# Patient Record
Sex: FEMALE | Race: ASIAN | NOT HISPANIC OR LATINO
[De-identification: names, ages, dates, MRNs, and addresses within clinical notes are randomized per-mention and may not be internally consistent; named-entity substitution may affect disease eponyms.]

---

## 2017-06-20 PROBLEM — Z94.0 KIDNEY TRANSPLANT RECIPIENT: Status: ACTIVE | Noted: 2017-06-20

## 2017-06-20 PROBLEM — I10 ESSENTIAL HYPERTENSION: Status: ACTIVE | Noted: 2017-06-20

## 2017-07-17 ENCOUNTER — HOSPITAL (OUTPATIENT)
Dept: OTHER | Age: 57
End: 2017-07-17
Attending: OPHTHALMOLOGY

## 2017-10-12 PROBLEM — M81.0 OSTEOPOROSIS WITHOUT CURRENT PATHOLOGICAL FRACTURE, UNSPECIFIED OSTEOPOROSIS TYPE: Status: ACTIVE | Noted: 2017-10-12

## 2017-10-12 PROBLEM — E03.9 ACQUIRED HYPOTHYROIDISM: Status: ACTIVE | Noted: 2017-10-12

## 2018-07-16 ENCOUNTER — HOSPITAL (OUTPATIENT)
Dept: OTHER | Age: 58
End: 2018-07-16
Attending: OPHTHALMOLOGY

## 2018-08-11 ENCOUNTER — APPOINTMENT (OUTPATIENT)
Dept: CT IMAGING | Facility: HOSPITAL | Age: 58
End: 2018-08-11
Attending: EMERGENCY MEDICINE
Payer: COMMERCIAL

## 2018-08-11 ENCOUNTER — HOSPITAL ENCOUNTER (EMERGENCY)
Facility: HOSPITAL | Age: 58
Discharge: HOME | End: 2018-08-11
Attending: EMERGENCY MEDICINE
Payer: COMMERCIAL

## 2018-08-11 VITALS
TEMPERATURE: 98 F | WEIGHT: 155 LBS | HEART RATE: 92 BPM | DIASTOLIC BLOOD PRESSURE: 71 MMHG | HEIGHT: 63 IN | SYSTOLIC BLOOD PRESSURE: 138 MMHG | OXYGEN SATURATION: 97 % | RESPIRATION RATE: 16 BRPM | BODY MASS INDEX: 27.46 KG/M2

## 2018-08-11 DIAGNOSIS — K52.9 GASTROENTERITIS: Primary | ICD-10-CM

## 2018-08-11 DIAGNOSIS — D72.829 LEUKOCYTOSIS, UNSPECIFIED TYPE: ICD-10-CM

## 2018-08-11 LAB
ALBUMIN SERPL-MCNC: 3.7 G/DL (ref 3.5–4.8)
ALBUMIN/GLOB SERPL: 0.8 {RATIO} (ref 1–2)
ALP LIVER SERPL-CCNC: 50 U/L (ref 46–118)
ALT SERPL-CCNC: 18 U/L (ref 14–54)
ANION GAP SERPL CALC-SCNC: 9 MMOL/L (ref 0–18)
AST SERPL-CCNC: 16 U/L (ref 15–41)
BASOPHILS # BLD AUTO: 0.05 X10(3) UL (ref 0–0.1)
BASOPHILS NFR BLD AUTO: 0.2 %
BILIRUB SERPL-MCNC: 0.5 MG/DL (ref 0.1–2)
BUN BLD-MCNC: 30 MG/DL (ref 8–20)
BUN/CREAT SERPL: 12.4 (ref 10–20)
CALCIUM BLD-MCNC: 9.4 MG/DL (ref 8.3–10.3)
CHLORIDE SERPL-SCNC: 111 MMOL/L (ref 101–111)
CO2 SERPL-SCNC: 21 MMOL/L (ref 22–32)
CREAT BLD-MCNC: 2.42 MG/DL (ref 0.55–1.02)
EOSINOPHIL # BLD AUTO: 0.1 X10(3) UL (ref 0–0.3)
EOSINOPHIL NFR BLD AUTO: 0.4 %
ERYTHROCYTE [DISTWIDTH] IN BLOOD BY AUTOMATED COUNT: 13.2 % (ref 11.5–16)
GLOBULIN PLAS-MCNC: 4.7 G/DL (ref 2.5–3.7)
GLUCOSE BLD-MCNC: 147 MG/DL (ref 70–99)
HCT VFR BLD AUTO: 45.3 % (ref 34–50)
HGB BLD-MCNC: 15 G/DL (ref 12–16)
IMMATURE GRANULOCYTE COUNT: 0.1 X10(3) UL (ref 0–1)
IMMATURE GRANULOCYTE RATIO %: 0.4 %
LYMPHOCYTES # BLD AUTO: 2.27 X10(3) UL (ref 0.9–4)
LYMPHOCYTES NFR BLD AUTO: 9.2 %
M PROTEIN MFR SERPL ELPH: 8.4 G/DL (ref 6.1–8.3)
MCH RBC QN AUTO: 28.3 PG (ref 27–33.2)
MCHC RBC AUTO-ENTMCNC: 33.1 G/DL (ref 31–37)
MCV RBC AUTO: 85.5 FL (ref 81–100)
MONOCYTES # BLD AUTO: 1.72 X10(3) UL (ref 0.1–1)
MONOCYTES NFR BLD AUTO: 6.9 %
NEUTROPHIL ABS PRELIM: 20.55 X10 (3) UL (ref 1.3–6.7)
NEUTROPHILS # BLD AUTO: 20.55 X10(3) UL (ref 1.3–6.7)
NEUTROPHILS NFR BLD AUTO: 82.9 %
OSMOLALITY SERPL CALC.SUM OF ELEC: 301 MOSM/KG (ref 275–295)
PLATELET # BLD AUTO: 270 10(3)UL (ref 150–450)
POTASSIUM SERPL-SCNC: 4.1 MMOL/L (ref 3.6–5.1)
RBC # BLD AUTO: 5.3 X10(6)UL (ref 3.8–5.1)
RED CELL DISTRIBUTION WIDTH-SD: 40.8 FL (ref 35.1–46.3)
SODIUM SERPL-SCNC: 141 MMOL/L (ref 136–144)
WBC # BLD AUTO: 24.8 X10(3) UL (ref 4–13)

## 2018-08-11 PROCEDURE — 85025 COMPLETE CBC W/AUTO DIFF WBC: CPT | Performed by: EMERGENCY MEDICINE

## 2018-08-11 PROCEDURE — 96374 THER/PROPH/DIAG INJ IV PUSH: CPT

## 2018-08-11 PROCEDURE — 99285 EMERGENCY DEPT VISIT HI MDM: CPT

## 2018-08-11 PROCEDURE — 80053 COMPREHEN METABOLIC PANEL: CPT | Performed by: EMERGENCY MEDICINE

## 2018-08-11 PROCEDURE — 99284 EMERGENCY DEPT VISIT MOD MDM: CPT

## 2018-08-11 PROCEDURE — 96375 TX/PRO/DX INJ NEW DRUG ADDON: CPT

## 2018-08-11 PROCEDURE — 74176 CT ABD & PELVIS W/O CONTRAST: CPT | Performed by: EMERGENCY MEDICINE

## 2018-08-11 PROCEDURE — 96361 HYDRATE IV INFUSION ADD-ON: CPT

## 2018-08-11 RX ORDER — DICYCLOMINE HCL 20 MG
20 TABLET ORAL 3 TIMES DAILY
Qty: 20 TABLET | Refills: 0 | Status: SHIPPED | OUTPATIENT
Start: 2018-08-11 | End: 2018-09-25 | Stop reason: ALTCHOICE

## 2018-08-11 RX ORDER — ONDANSETRON 2 MG/ML
4 INJECTION INTRAMUSCULAR; INTRAVENOUS ONCE
Status: COMPLETED | OUTPATIENT
Start: 2018-08-11 | End: 2018-08-11

## 2018-08-11 RX ORDER — ONDANSETRON 4 MG/1
4 TABLET, ORALLY DISINTEGRATING ORAL EVERY 4 HOURS PRN
Qty: 20 TABLET | Refills: 0 | Status: SHIPPED | OUTPATIENT
Start: 2018-08-11 | End: 2018-08-16

## 2018-08-11 RX ORDER — MORPHINE SULFATE 4 MG/ML
4 INJECTION, SOLUTION INTRAMUSCULAR; INTRAVENOUS EVERY 30 MIN PRN
Status: DISCONTINUED | OUTPATIENT
Start: 2018-08-11 | End: 2018-08-11

## 2018-08-11 NOTE — ED PROVIDER NOTES
Patient Seen in: [facility] Emergency Department    History   Patient presents with:  Nausea/Vomiting/Diarrhea (gastrointestinal)  Abdomen/Flank Pain (GI/)    Stated Complaint:     HPI    Patient is a 17-year-old female comes emergency room for ev reactive to light and accommodation, extraocular motion is intact, sclerae white, conjunctiva is pink. Oropharynx is unremarkable, no exudate. NECK: Supple, trachea midline, no lymphadenopathy.    LUNG: Lungs clear to auscultation bilaterally, no wheezing RAINBOW DRAW GOLD   C. DIFFICILE(TOXIGENIC)PCR   OCCULT BLOOD, STOOL   STOOL CULTURE W/SHIGATOXIN   White blood cell 24.8. Glucose 147. Bicarb 21. BUN 30.   Creatinine 2.42.    CT abdomen pelvis without contrast    IMPRESSION:    No findings to explain verbal and written discharge and follow-up instructions were provided to help prevent relapse or worsening.   Patient was instructed to follow-up with her primary care provider for further evaluation and treatment, but to return immediately to the ER for wo

## 2018-08-11 NOTE — ED NOTES
Pt ok to have po fluids per Dr. Ronnie Shabazz. Pt provided with cup of water and encouraged to drink slowly.

## 2018-08-15 ENCOUNTER — LAB ENCOUNTER (OUTPATIENT)
Dept: LAB | Age: 58
End: 2018-08-15
Attending: EMERGENCY MEDICINE
Payer: COMMERCIAL

## 2018-08-15 DIAGNOSIS — R69 DIAGNOSIS UNKNOWN: Primary | ICD-10-CM

## 2018-08-15 PROCEDURE — 87045 FECES CULTURE AEROBIC BACT: CPT

## 2018-08-15 PROCEDURE — 89055 LEUKOCYTE ASSESSMENT FECAL: CPT

## 2018-08-15 PROCEDURE — 87427 SHIGA-LIKE TOXIN AG IA: CPT

## 2018-08-15 PROCEDURE — 87077 CULTURE AEROBIC IDENTIFY: CPT

## 2018-08-15 PROCEDURE — 87046 STOOL CULTR AEROBIC BACT EA: CPT

## 2018-08-15 PROCEDURE — 87493 C DIFF AMPLIFIED PROBE: CPT

## 2018-10-10 PROCEDURE — 84100 ASSAY OF PHOSPHORUS: CPT | Performed by: INTERNAL MEDICINE

## 2018-10-10 PROCEDURE — 82043 UR ALBUMIN QUANTITATIVE: CPT | Performed by: INTERNAL MEDICINE

## 2018-10-10 PROCEDURE — 82310 ASSAY OF CALCIUM: CPT | Performed by: INTERNAL MEDICINE

## 2018-10-10 PROCEDURE — 81001 URINALYSIS AUTO W/SCOPE: CPT | Performed by: INTERNAL MEDICINE

## 2018-10-10 PROCEDURE — 82570 ASSAY OF URINE CREATININE: CPT | Performed by: INTERNAL MEDICINE

## 2018-10-10 PROCEDURE — 84156 ASSAY OF PROTEIN URINE: CPT | Performed by: INTERNAL MEDICINE

## 2018-10-10 PROCEDURE — 82565 ASSAY OF CREATININE: CPT | Performed by: INTERNAL MEDICINE

## 2018-10-10 PROCEDURE — 83970 ASSAY OF PARATHORMONE: CPT | Performed by: INTERNAL MEDICINE

## 2018-10-10 PROCEDURE — 80197 ASSAY OF TACROLIMUS: CPT | Performed by: INTERNAL MEDICINE

## 2018-10-22 PROBLEM — M54.50 LUMBAR BACK PAIN: Status: ACTIVE | Noted: 2018-10-22

## 2018-11-06 PROBLEM — M25.562 ACUTE PAIN OF LEFT KNEE: Status: ACTIVE | Noted: 2018-11-06

## 2018-12-01 PROCEDURE — 80197 ASSAY OF TACROLIMUS: CPT | Performed by: INTERNAL MEDICINE

## 2018-12-29 ENCOUNTER — APPOINTMENT (OUTPATIENT)
Dept: GENERAL RADIOLOGY | Facility: HOSPITAL | Age: 58
End: 2018-12-29
Attending: EMERGENCY MEDICINE
Payer: COMMERCIAL

## 2018-12-29 ENCOUNTER — APPOINTMENT (OUTPATIENT)
Dept: ULTRASOUND IMAGING | Facility: HOSPITAL | Age: 58
End: 2018-12-29
Attending: EMERGENCY MEDICINE
Payer: COMMERCIAL

## 2018-12-29 ENCOUNTER — HOSPITAL ENCOUNTER (EMERGENCY)
Facility: HOSPITAL | Age: 58
Discharge: HOME | End: 2018-12-29
Attending: EMERGENCY MEDICINE
Payer: COMMERCIAL

## 2018-12-29 VITALS
OXYGEN SATURATION: 100 % | DIASTOLIC BLOOD PRESSURE: 74 MMHG | HEART RATE: 68 BPM | SYSTOLIC BLOOD PRESSURE: 132 MMHG | HEIGHT: 64 IN | WEIGHT: 142 LBS | TEMPERATURE: 97 F | RESPIRATION RATE: 18 BRPM | BODY MASS INDEX: 24.24 KG/M2

## 2018-12-29 DIAGNOSIS — I82.401 ACUTE DEEP VEIN THROMBOSIS (DVT) OF RIGHT LOWER EXTREMITY, UNSPECIFIED VEIN (HCC): Primary | ICD-10-CM

## 2018-12-29 LAB
APTT PPP: 24.1 SECONDS (ref 26.1–34.6)
INR BLD: 1.06 (ref 0.9–1.1)
PSA SERPL DL<=0.01 NG/ML-MCNC: 14.2 SECONDS (ref 12.4–14.7)

## 2018-12-29 PROCEDURE — 80197 ASSAY OF TACROLIMUS: CPT | Performed by: INTERNAL MEDICINE

## 2018-12-29 PROCEDURE — 93971 EXTREMITY STUDY: CPT | Performed by: EMERGENCY MEDICINE

## 2018-12-29 PROCEDURE — 99285 EMERGENCY DEPT VISIT HI MDM: CPT

## 2018-12-29 PROCEDURE — 36415 COLL VENOUS BLD VENIPUNCTURE: CPT

## 2018-12-29 PROCEDURE — 85610 PROTHROMBIN TIME: CPT | Performed by: EMERGENCY MEDICINE

## 2018-12-29 PROCEDURE — 73630 X-RAY EXAM OF FOOT: CPT | Performed by: EMERGENCY MEDICINE

## 2018-12-29 PROCEDURE — 85730 THROMBOPLASTIN TIME PARTIAL: CPT | Performed by: EMERGENCY MEDICINE

## 2018-12-29 PROCEDURE — 73590 X-RAY EXAM OF LOWER LEG: CPT | Performed by: EMERGENCY MEDICINE

## 2018-12-29 PROCEDURE — 81001 URINALYSIS AUTO W/SCOPE: CPT | Performed by: INTERNAL MEDICINE

## 2018-12-29 PROCEDURE — 87799 DETECT AGENT NOS DNA QUANT: CPT | Performed by: INTERNAL MEDICINE

## 2018-12-29 RX ORDER — HYDROCODONE BITARTRATE AND ACETAMINOPHEN 5; 325 MG/1; MG/1
1 TABLET ORAL ONCE
Status: COMPLETED | OUTPATIENT
Start: 2018-12-29 | End: 2018-12-29

## 2018-12-29 RX ORDER — HYDROCODONE BITARTRATE AND ACETAMINOPHEN 5; 325 MG/1; MG/1
1-2 TABLET ORAL EVERY 4 HOURS PRN
Qty: 10 TABLET | Refills: 0 | Status: SHIPPED | OUTPATIENT
Start: 2018-12-29 | End: 2019-01-05

## 2018-12-30 NOTE — ED PROVIDER NOTES
Patient Seen in: [facility] Emergency Department    History   Patient presents with:  Lower Extremity Injury (musculoskeletal)    Stated Complaint: right leg and foot pain    HPI    Patient is a 17-year-old female who has a history of right leg foot CVA tenderness, no midline bony tenderness. ABDOMEN: + Bowel sounds, soft, nontender, nondistended. No rebound, no guarding, no hepatosplenomegaly.   EXTREMITIES: Right lower extremity, knee for range of motion nontender, ankle normal dorsiflexion plantar times daily for 7 days. , Normal, Disp-28 tablet, R-0    !! apixaban 5 MG Oral Tab  Take 1 tablet (5 mg total) by mouth 2 (two) times daily for 14 days. , Normal, Disp-28 tablet, R-0    HYDROcodone-acetaminophen 5-325 MG Oral Tab  Take 1-2 tablets by mouth e

## 2018-12-30 NOTE — ED NOTES
Patient and family educated on new prescriptions and discharge instructions. Verbalized understanding.

## 2019-02-13 PROCEDURE — 87799 DETECT AGENT NOS DNA QUANT: CPT | Performed by: INTERNAL MEDICINE

## 2019-02-13 PROCEDURE — 82565 ASSAY OF CREATININE: CPT | Performed by: INTERNAL MEDICINE

## 2019-02-13 PROCEDURE — 81001 URINALYSIS AUTO W/SCOPE: CPT | Performed by: INTERNAL MEDICINE

## 2019-02-13 PROCEDURE — 83970 ASSAY OF PARATHORMONE: CPT | Performed by: INTERNAL MEDICINE

## 2019-02-13 PROCEDURE — 82043 UR ALBUMIN QUANTITATIVE: CPT | Performed by: INTERNAL MEDICINE

## 2019-02-13 PROCEDURE — 84100 ASSAY OF PHOSPHORUS: CPT | Performed by: INTERNAL MEDICINE

## 2019-02-13 PROCEDURE — 84156 ASSAY OF PROTEIN URINE: CPT | Performed by: INTERNAL MEDICINE

## 2019-02-13 PROCEDURE — 80197 ASSAY OF TACROLIMUS: CPT | Performed by: INTERNAL MEDICINE

## 2019-02-13 PROCEDURE — 82310 ASSAY OF CALCIUM: CPT | Performed by: INTERNAL MEDICINE

## 2019-02-13 PROCEDURE — 82570 ASSAY OF URINE CREATININE: CPT | Performed by: INTERNAL MEDICINE

## 2019-04-01 PROCEDURE — 84100 ASSAY OF PHOSPHORUS: CPT | Performed by: INTERNAL MEDICINE

## 2019-04-01 PROCEDURE — 82570 ASSAY OF URINE CREATININE: CPT | Performed by: INTERNAL MEDICINE

## 2019-04-01 PROCEDURE — 82310 ASSAY OF CALCIUM: CPT | Performed by: INTERNAL MEDICINE

## 2019-04-01 PROCEDURE — 80197 ASSAY OF TACROLIMUS: CPT | Performed by: INTERNAL MEDICINE

## 2019-04-01 PROCEDURE — 81001 URINALYSIS AUTO W/SCOPE: CPT | Performed by: INTERNAL MEDICINE

## 2019-04-01 PROCEDURE — 84156 ASSAY OF PROTEIN URINE: CPT | Performed by: INTERNAL MEDICINE

## 2019-04-01 PROCEDURE — 82565 ASSAY OF CREATININE: CPT | Performed by: INTERNAL MEDICINE

## 2019-04-01 PROCEDURE — 83970 ASSAY OF PARATHORMONE: CPT | Performed by: INTERNAL MEDICINE

## 2019-05-15 ENCOUNTER — LAB ENCOUNTER (OUTPATIENT)
Dept: LAB | Age: 59
End: 2019-05-15
Attending: INTERNAL MEDICINE
Payer: COMMERCIAL

## 2019-05-15 DIAGNOSIS — M81.0 SENILE OSTEOPOROSIS: Primary | ICD-10-CM

## 2019-05-15 PROCEDURE — 82306 VITAMIN D 25 HYDROXY: CPT

## 2019-05-15 PROCEDURE — 80048 BASIC METABOLIC PNL TOTAL CA: CPT

## 2019-06-28 PROCEDURE — 82310 ASSAY OF CALCIUM: CPT | Performed by: INTERNAL MEDICINE

## 2019-06-28 PROCEDURE — 81001 URINALYSIS AUTO W/SCOPE: CPT | Performed by: INTERNAL MEDICINE

## 2019-06-28 PROCEDURE — 83970 ASSAY OF PARATHORMONE: CPT | Performed by: INTERNAL MEDICINE

## 2019-06-28 PROCEDURE — 82565 ASSAY OF CREATININE: CPT | Performed by: INTERNAL MEDICINE

## 2019-06-28 PROCEDURE — 80197 ASSAY OF TACROLIMUS: CPT | Performed by: INTERNAL MEDICINE

## 2019-06-28 PROCEDURE — 84100 ASSAY OF PHOSPHORUS: CPT | Performed by: INTERNAL MEDICINE

## 2019-07-22 ENCOUNTER — LAB ENCOUNTER (OUTPATIENT)
Dept: LAB | Age: 59
End: 2019-07-22
Attending: TRANSPLANT SURGERY
Payer: COMMERCIAL

## 2019-07-22 DIAGNOSIS — Z51.81 ENCOUNTER FOR THERAPEUTIC DRUG MONITORING: Primary | ICD-10-CM

## 2019-07-22 LAB
CREAT BLD-MCNC: 0.88 MG/DL (ref 0.55–1.02)
GLUCOSE BLD-MCNC: 92 MG/DL (ref 70–99)
HCT VFR BLD AUTO: 40.1 % (ref 35–48)
POTASSIUM SERPL-SCNC: 4 MMOL/L (ref 3.5–5.1)
WBC # BLD AUTO: 16.9 X10(3) UL (ref 4–11)

## 2019-07-22 PROCEDURE — 84132 ASSAY OF SERUM POTASSIUM: CPT

## 2019-07-22 PROCEDURE — 82947 ASSAY GLUCOSE BLOOD QUANT: CPT

## 2019-07-22 PROCEDURE — 87799 DETECT AGENT NOS DNA QUANT: CPT

## 2019-07-22 PROCEDURE — 85048 AUTOMATED LEUKOCYTE COUNT: CPT

## 2019-07-22 PROCEDURE — 80197 ASSAY OF TACROLIMUS: CPT

## 2019-07-22 PROCEDURE — 82565 ASSAY OF CREATININE: CPT

## 2019-07-22 PROCEDURE — 85014 HEMATOCRIT: CPT

## 2019-07-24 LAB
BK VIRUS DNA, QUANT COPY/ML: <390 CPY/ML
BK VIRUS DNA, QUANT INTERP: NOT DETECTED
BK VIRUS DNA, QUANTITATION: <2.6 LOG
TACROLIMUS: 6.5 NG/ML

## 2019-07-25 ENCOUNTER — LAB ENCOUNTER (OUTPATIENT)
Dept: LAB | Age: 59
End: 2019-07-25
Attending: TRANSPLANT SURGERY
Payer: COMMERCIAL

## 2019-07-25 DIAGNOSIS — Z51.81 ENCOUNTER FOR THERAPEUTIC DRUG MONITORING: ICD-10-CM

## 2019-07-25 DIAGNOSIS — Z79.899 ENCOUNTER FOR LONG-TERM (CURRENT) USE OF OTHER MEDICATIONS: Primary | ICD-10-CM

## 2019-07-25 DIAGNOSIS — Z79.52 LONG TERM CURRENT USE OF SYSTEMIC STEROIDS: ICD-10-CM

## 2019-07-25 LAB
CREAT BLD-MCNC: 1.07 MG/DL (ref 0.55–1.02)
GLUCOSE BLD-MCNC: 96 MG/DL (ref 70–99)
HCT VFR BLD AUTO: 38.4 % (ref 35–48)
POTASSIUM SERPL-SCNC: 4.5 MMOL/L (ref 3.5–5.1)
WBC # BLD AUTO: 14 X10(3) UL (ref 4–11)

## 2019-07-25 PROCEDURE — 85048 AUTOMATED LEUKOCYTE COUNT: CPT

## 2019-07-25 PROCEDURE — 82565 ASSAY OF CREATININE: CPT

## 2019-07-25 PROCEDURE — 84132 ASSAY OF SERUM POTASSIUM: CPT

## 2019-07-25 PROCEDURE — 85014 HEMATOCRIT: CPT

## 2019-07-25 PROCEDURE — 82947 ASSAY GLUCOSE BLOOD QUANT: CPT

## 2019-07-29 ENCOUNTER — LAB ENCOUNTER (OUTPATIENT)
Dept: LAB | Age: 59
End: 2019-07-29
Attending: TRANSPLANT SURGERY
Payer: COMMERCIAL

## 2019-07-29 DIAGNOSIS — Z79.899 ENCOUNTER FOR LONG-TERM (CURRENT) USE OF OTHER MEDICATIONS: Primary | ICD-10-CM

## 2019-07-29 LAB
CREAT BLD-MCNC: 1 MG/DL (ref 0.55–1.02)
GLUCOSE BLD-MCNC: 92 MG/DL (ref 70–99)
HCT VFR BLD AUTO: 37.1 % (ref 35–48)
POTASSIUM SERPL-SCNC: 4.4 MMOL/L (ref 3.5–5.1)
WBC # BLD AUTO: 19.4 X10(3) UL (ref 4–11)

## 2019-07-29 PROCEDURE — 84132 ASSAY OF SERUM POTASSIUM: CPT

## 2019-07-29 PROCEDURE — 85048 AUTOMATED LEUKOCYTE COUNT: CPT

## 2019-07-29 PROCEDURE — 82565 ASSAY OF CREATININE: CPT

## 2019-07-29 PROCEDURE — 85014 HEMATOCRIT: CPT

## 2019-07-29 PROCEDURE — 82947 ASSAY GLUCOSE BLOOD QUANT: CPT

## 2019-08-01 ENCOUNTER — LAB ENCOUNTER (OUTPATIENT)
Dept: LAB | Age: 59
End: 2019-08-01
Attending: TRANSPLANT SURGERY
Payer: COMMERCIAL

## 2019-08-01 DIAGNOSIS — Z79.52 LONG TERM CURRENT USE OF SYSTEMIC STEROIDS: Primary | ICD-10-CM

## 2019-08-01 DIAGNOSIS — Z51.81 ENCOUNTER FOR THERAPEUTIC DRUG MONITORING: ICD-10-CM

## 2019-08-01 DIAGNOSIS — Z79.899 ENCOUNTER FOR LONG-TERM (CURRENT) USE OF OTHER MEDICATIONS: ICD-10-CM

## 2019-08-01 DIAGNOSIS — Z94.0 KIDNEY REPLACED BY TRANSPLANT: ICD-10-CM

## 2019-08-01 LAB
CREAT BLD-MCNC: 1.09 MG/DL (ref 0.55–1.02)
GLUCOSE BLD-MCNC: 95 MG/DL (ref 70–99)
HCT VFR BLD AUTO: 38.6 % (ref 35–48)
POTASSIUM SERPL-SCNC: 4.2 MMOL/L (ref 3.5–5.1)
WBC # BLD AUTO: 14 X10(3) UL (ref 4–11)

## 2019-08-01 PROCEDURE — 85048 AUTOMATED LEUKOCYTE COUNT: CPT

## 2019-08-01 PROCEDURE — 82565 ASSAY OF CREATININE: CPT

## 2019-08-01 PROCEDURE — 84132 ASSAY OF SERUM POTASSIUM: CPT

## 2019-08-01 PROCEDURE — 82947 ASSAY GLUCOSE BLOOD QUANT: CPT

## 2019-08-01 PROCEDURE — 80197 ASSAY OF TACROLIMUS: CPT

## 2019-08-01 PROCEDURE — 85014 HEMATOCRIT: CPT

## 2019-08-03 LAB — TACROLIMUS: 11 NG/ML

## 2019-08-08 ENCOUNTER — LAB ENCOUNTER (OUTPATIENT)
Dept: LAB | Age: 59
End: 2019-08-08
Attending: TRANSPLANT SURGERY
Payer: COMMERCIAL

## 2019-08-08 DIAGNOSIS — Z94.0 KIDNEY REPLACED BY TRANSPLANT: ICD-10-CM

## 2019-08-08 DIAGNOSIS — Z79.52 LONG TERM CURRENT USE OF SYSTEMIC STEROIDS: Primary | ICD-10-CM

## 2019-08-08 LAB
CREAT BLD-MCNC: 1.29 MG/DL (ref 0.55–1.02)
GLUCOSE BLD-MCNC: 94 MG/DL (ref 70–99)
HCT VFR BLD AUTO: 37.2 % (ref 35–48)
POTASSIUM SERPL-SCNC: 4.2 MMOL/L (ref 3.5–5.1)
WBC # BLD AUTO: 11 X10(3) UL (ref 4–11)

## 2019-08-08 PROCEDURE — 82565 ASSAY OF CREATININE: CPT

## 2019-08-08 PROCEDURE — 85048 AUTOMATED LEUKOCYTE COUNT: CPT

## 2019-08-08 PROCEDURE — 80197 ASSAY OF TACROLIMUS: CPT

## 2019-08-08 PROCEDURE — 85014 HEMATOCRIT: CPT

## 2019-08-08 PROCEDURE — 82947 ASSAY GLUCOSE BLOOD QUANT: CPT

## 2019-08-08 PROCEDURE — 84132 ASSAY OF SERUM POTASSIUM: CPT

## 2019-08-09 LAB — TACROLIMUS: 8.6 NG/ML

## 2019-08-12 ENCOUNTER — LAB ENCOUNTER (OUTPATIENT)
Dept: LAB | Age: 59
End: 2019-08-12
Attending: TRANSPLANT SURGERY
Payer: COMMERCIAL

## 2019-08-12 DIAGNOSIS — Z79.899 ENCOUNTER FOR LONG-TERM (CURRENT) USE OF OTHER MEDICATIONS: ICD-10-CM

## 2019-08-12 DIAGNOSIS — Z51.81 ENCOUNTER FOR THERAPEUTIC DRUG MONITORING: ICD-10-CM

## 2019-08-12 DIAGNOSIS — Z79.52 LONG TERM CURRENT USE OF SYSTEMIC STEROIDS: Primary | ICD-10-CM

## 2019-08-12 LAB
CREAT BLD-MCNC: 1.21 MG/DL (ref 0.55–1.02)
GLUCOSE BLD-MCNC: 103 MG/DL (ref 70–99)
HCT VFR BLD AUTO: 38.3 % (ref 35–48)
POTASSIUM SERPL-SCNC: 4.2 MMOL/L (ref 3.5–5.1)
WBC # BLD AUTO: 9.7 X10(3) UL (ref 4–11)

## 2019-08-12 PROCEDURE — 85014 HEMATOCRIT: CPT

## 2019-08-12 PROCEDURE — 84132 ASSAY OF SERUM POTASSIUM: CPT

## 2019-08-12 PROCEDURE — 87799 DETECT AGENT NOS DNA QUANT: CPT

## 2019-08-12 PROCEDURE — 82947 ASSAY GLUCOSE BLOOD QUANT: CPT

## 2019-08-12 PROCEDURE — 82565 ASSAY OF CREATININE: CPT

## 2019-08-12 PROCEDURE — 85048 AUTOMATED LEUKOCYTE COUNT: CPT

## 2019-08-15 ENCOUNTER — LAB ENCOUNTER (OUTPATIENT)
Dept: LAB | Age: 59
End: 2019-08-15
Attending: TRANSPLANT SURGERY
Payer: COMMERCIAL

## 2019-08-15 DIAGNOSIS — Z79.52 LONG TERM CURRENT USE OF SYSTEMIC STEROIDS: ICD-10-CM

## 2019-08-15 DIAGNOSIS — Z51.81 ENCOUNTER FOR THERAPEUTIC DRUG MONITORING: Primary | ICD-10-CM

## 2019-08-15 DIAGNOSIS — Z94.0 KIDNEY REPLACED BY TRANSPLANT: ICD-10-CM

## 2019-08-15 LAB
BK VIRUS DNA, QUANT COPY/ML: <390 CPY/ML
BK VIRUS DNA, QUANT INTERP: NOT DETECTED
BK VIRUS DNA, QUANTITATION: <2.6 LOG
CREAT BLD-MCNC: 1.27 MG/DL (ref 0.55–1.02)
GLUCOSE BLD-MCNC: 102 MG/DL (ref 70–99)
HCT VFR BLD AUTO: 38.4 % (ref 35–48)
WBC # BLD AUTO: 10 X10(3) UL (ref 4–11)

## 2019-08-15 PROCEDURE — 83970 ASSAY OF PARATHORMONE: CPT | Performed by: INTERNAL MEDICINE

## 2019-08-15 PROCEDURE — 82310 ASSAY OF CALCIUM: CPT | Performed by: INTERNAL MEDICINE

## 2019-08-15 PROCEDURE — 85048 AUTOMATED LEUKOCYTE COUNT: CPT

## 2019-08-15 PROCEDURE — 82947 ASSAY GLUCOSE BLOOD QUANT: CPT

## 2019-08-15 PROCEDURE — 82565 ASSAY OF CREATININE: CPT

## 2019-08-15 PROCEDURE — 85014 HEMATOCRIT: CPT

## 2019-08-15 PROCEDURE — 82565 ASSAY OF CREATININE: CPT | Performed by: INTERNAL MEDICINE

## 2019-08-15 PROCEDURE — 80197 ASSAY OF TACROLIMUS: CPT

## 2019-08-17 LAB — TACROLIMUS: 9.9 NG/ML

## 2019-08-19 ENCOUNTER — LAB ENCOUNTER (OUTPATIENT)
Dept: LAB | Age: 59
End: 2019-08-19
Attending: TRANSPLANT SURGERY
Payer: COMMERCIAL

## 2019-08-19 DIAGNOSIS — Z94.0 KIDNEY REPLACED BY TRANSPLANT: ICD-10-CM

## 2019-08-19 DIAGNOSIS — Z51.81 ENCOUNTER FOR THERAPEUTIC DRUG MONITORING: ICD-10-CM

## 2019-08-19 DIAGNOSIS — Z79.52 LONG TERM CURRENT USE OF SYSTEMIC STEROIDS: Primary | ICD-10-CM

## 2019-08-19 DIAGNOSIS — Z79.899 ENCOUNTER FOR LONG-TERM (CURRENT) USE OF OTHER MEDICATIONS: ICD-10-CM

## 2019-08-19 LAB
CREAT BLD-MCNC: 1.13 MG/DL (ref 0.55–1.02)
GLUCOSE BLD-MCNC: 103 MG/DL (ref 70–99)
HCT VFR BLD AUTO: 36.9 % (ref 35–48)
POTASSIUM SERPL-SCNC: 4 MMOL/L (ref 3.5–5.1)
WBC # BLD AUTO: 11 X10(3) UL (ref 4–11)

## 2019-08-19 PROCEDURE — 84132 ASSAY OF SERUM POTASSIUM: CPT

## 2019-08-19 PROCEDURE — 82947 ASSAY GLUCOSE BLOOD QUANT: CPT

## 2019-08-19 PROCEDURE — 80197 ASSAY OF TACROLIMUS: CPT

## 2019-08-19 PROCEDURE — 85014 HEMATOCRIT: CPT

## 2019-08-19 PROCEDURE — 85048 AUTOMATED LEUKOCYTE COUNT: CPT

## 2019-08-19 PROCEDURE — 82565 ASSAY OF CREATININE: CPT

## 2019-08-22 ENCOUNTER — LAB ENCOUNTER (OUTPATIENT)
Dept: LAB | Age: 59
End: 2019-08-22
Attending: TRANSPLANT SURGERY
Payer: COMMERCIAL

## 2019-08-22 DIAGNOSIS — Z79.52 LONG TERM CURRENT USE OF SYSTEMIC STEROIDS: Primary | ICD-10-CM

## 2019-08-22 DIAGNOSIS — Z79.899 ENCOUNTER FOR LONG-TERM (CURRENT) USE OF OTHER MEDICATIONS: ICD-10-CM

## 2019-08-22 DIAGNOSIS — Z94.0 KIDNEY REPLACED BY TRANSPLANT: ICD-10-CM

## 2019-08-22 LAB
CREAT BLD-MCNC: 1.24 MG/DL (ref 0.55–1.02)
GLUCOSE BLD-MCNC: 106 MG/DL (ref 70–99)
HCT VFR BLD AUTO: 38.4 % (ref 35–48)
WBC # BLD AUTO: 8.6 X10(3) UL (ref 4–11)

## 2019-08-22 PROCEDURE — 82947 ASSAY GLUCOSE BLOOD QUANT: CPT

## 2019-08-22 PROCEDURE — 82565 ASSAY OF CREATININE: CPT

## 2019-08-22 PROCEDURE — 84132 ASSAY OF SERUM POTASSIUM: CPT

## 2019-08-22 PROCEDURE — 85014 HEMATOCRIT: CPT

## 2019-08-22 PROCEDURE — 85048 AUTOMATED LEUKOCYTE COUNT: CPT

## 2019-08-23 LAB — POTASSIUM SERPL-SCNC: 4.3 MMOL/L (ref 3.5–5.1)

## 2019-08-24 LAB — TACROLIMUS: 10.6 NG/ML

## 2019-08-26 ENCOUNTER — LAB ENCOUNTER (OUTPATIENT)
Dept: LAB | Age: 59
End: 2019-08-26
Attending: TRANSPLANT SURGERY
Payer: COMMERCIAL

## 2019-08-26 DIAGNOSIS — Z79.52 LONG TERM CURRENT USE OF SYSTEMIC STEROIDS: Primary | ICD-10-CM

## 2019-08-26 LAB
CREAT BLD-MCNC: 1.2 MG/DL (ref 0.55–1.02)
GLUCOSE BLD-MCNC: 108 MG/DL (ref 70–99)
HCT VFR BLD AUTO: 38.2 % (ref 35–48)
POTASSIUM SERPL-SCNC: 4.4 MMOL/L (ref 3.5–5.1)
WBC # BLD AUTO: 8.9 X10(3) UL (ref 4–11)

## 2019-08-26 PROCEDURE — 80197 ASSAY OF TACROLIMUS: CPT

## 2019-08-26 PROCEDURE — 82565 ASSAY OF CREATININE: CPT

## 2019-08-26 PROCEDURE — 82947 ASSAY GLUCOSE BLOOD QUANT: CPT

## 2019-08-26 PROCEDURE — 82570 ASSAY OF URINE CREATININE: CPT

## 2019-08-26 PROCEDURE — 85014 HEMATOCRIT: CPT

## 2019-08-26 PROCEDURE — 85048 AUTOMATED LEUKOCYTE COUNT: CPT

## 2019-08-26 PROCEDURE — 84132 ASSAY OF SERUM POTASSIUM: CPT

## 2019-08-28 LAB — TACROLIMUS: 9.6 NG/ML

## 2019-09-04 ENCOUNTER — LAB ENCOUNTER (OUTPATIENT)
Dept: LAB | Age: 59
End: 2019-09-04
Attending: TRANSPLANT SURGERY
Payer: COMMERCIAL

## 2019-09-04 DIAGNOSIS — Z79.52 LONG TERM CURRENT USE OF SYSTEMIC STEROIDS: Primary | ICD-10-CM

## 2019-09-04 DIAGNOSIS — Z94.0 KIDNEY REPLACED BY TRANSPLANT: ICD-10-CM

## 2019-09-04 DIAGNOSIS — Z79.899 ENCOUNTER FOR LONG-TERM (CURRENT) USE OF OTHER MEDICATIONS: ICD-10-CM

## 2019-09-04 DIAGNOSIS — Z51.81 ENCOUNTER FOR THERAPEUTIC DRUG MONITORING: ICD-10-CM

## 2019-09-04 LAB
CREAT BLD-MCNC: 1.2 MG/DL (ref 0.55–1.02)
GLUCOSE BLD-MCNC: 119 MG/DL (ref 70–99)
HCT VFR BLD AUTO: 38.2 % (ref 35–48)
POTASSIUM SERPL-SCNC: 4.1 MMOL/L (ref 3.5–5.1)
WBC # BLD AUTO: 6.5 X10(3) UL (ref 4–11)

## 2019-09-04 PROCEDURE — 87799 DETECT AGENT NOS DNA QUANT: CPT

## 2019-09-04 PROCEDURE — 82947 ASSAY GLUCOSE BLOOD QUANT: CPT

## 2019-09-04 PROCEDURE — 82565 ASSAY OF CREATININE: CPT

## 2019-09-04 PROCEDURE — 85048 AUTOMATED LEUKOCYTE COUNT: CPT

## 2019-09-04 PROCEDURE — 80197 ASSAY OF TACROLIMUS: CPT

## 2019-09-04 PROCEDURE — 85014 HEMATOCRIT: CPT

## 2019-09-04 PROCEDURE — 84132 ASSAY OF SERUM POTASSIUM: CPT

## 2019-09-06 LAB — TACROLIMUS: 13.6 NG/ML

## 2019-09-07 LAB
BK VIRUS DNA, QUANT COPY/ML: <390 CPY/ML
BK VIRUS DNA, QUANT INTERP: NOT DETECTED
BK VIRUS DNA, QUANTITATION: <2.6 LOG

## 2019-09-13 ENCOUNTER — LAB ENCOUNTER (OUTPATIENT)
Dept: LAB | Age: 59
End: 2019-09-13
Attending: TRANSPLANT SURGERY
Payer: COMMERCIAL

## 2019-09-13 DIAGNOSIS — Z79.52 LONG TERM CURRENT USE OF SYSTEMIC STEROIDS: Primary | ICD-10-CM

## 2019-09-13 DIAGNOSIS — Z94.0 KIDNEY REPLACED BY TRANSPLANT: ICD-10-CM

## 2019-09-13 DIAGNOSIS — Z79.899 ENCOUNTER FOR LONG-TERM (CURRENT) USE OF OTHER MEDICATIONS: ICD-10-CM

## 2019-09-13 DIAGNOSIS — Z51.81 ENCOUNTER FOR THERAPEUTIC DRUG MONITORING: ICD-10-CM

## 2019-09-13 LAB
CREAT BLD-MCNC: 1.63 MG/DL (ref 0.55–1.02)
GLUCOSE BLD-MCNC: 121 MG/DL (ref 70–99)
HCT VFR BLD AUTO: 38.1 % (ref 35–48)
POTASSIUM SERPL-SCNC: 3.9 MMOL/L (ref 3.5–5.1)
WBC # BLD AUTO: 5.9 X10(3) UL (ref 4–11)

## 2019-09-13 PROCEDURE — 80197 ASSAY OF TACROLIMUS: CPT

## 2019-09-13 PROCEDURE — 85048 AUTOMATED LEUKOCYTE COUNT: CPT

## 2019-09-13 PROCEDURE — 82947 ASSAY GLUCOSE BLOOD QUANT: CPT

## 2019-09-13 PROCEDURE — 85014 HEMATOCRIT: CPT

## 2019-09-13 PROCEDURE — 82565 ASSAY OF CREATININE: CPT

## 2019-09-13 PROCEDURE — 84132 ASSAY OF SERUM POTASSIUM: CPT

## 2019-09-14 LAB — TACROLIMUS: 9.2 NG/ML

## 2019-09-17 ENCOUNTER — LAB ENCOUNTER (OUTPATIENT)
Dept: LAB | Facility: HOSPITAL | Age: 59
End: 2019-09-17
Attending: TRANSPLANT SURGERY
Payer: COMMERCIAL

## 2019-09-17 DIAGNOSIS — Z79.899 ENCOUNTER FOR LONG-TERM (CURRENT) USE OF OTHER MEDICATIONS: Primary | ICD-10-CM

## 2019-09-17 DIAGNOSIS — Z94.0 KIDNEY REPLACED BY TRANSPLANT: ICD-10-CM

## 2019-09-17 DIAGNOSIS — Z51.81 ENCOUNTER FOR THERAPEUTIC DRUG MONITORING: ICD-10-CM

## 2019-09-17 DIAGNOSIS — Z79.52 LONG TERM CURRENT USE OF SYSTEMIC STEROIDS: ICD-10-CM

## 2019-09-17 LAB
CREAT BLD-MCNC: 1.43 MG/DL (ref 0.55–1.02)
GLUCOSE BLD-MCNC: 122 MG/DL (ref 70–99)
HCT VFR BLD AUTO: 39.1 % (ref 35–48)
POTASSIUM SERPL-SCNC: 3.9 MMOL/L (ref 3.5–5.1)
WBC # BLD AUTO: 6.2 X10(3) UL (ref 4–11)

## 2019-09-17 PROCEDURE — 82947 ASSAY GLUCOSE BLOOD QUANT: CPT

## 2019-09-17 PROCEDURE — 87799 DETECT AGENT NOS DNA QUANT: CPT

## 2019-09-17 PROCEDURE — 36415 COLL VENOUS BLD VENIPUNCTURE: CPT

## 2019-09-17 PROCEDURE — 85048 AUTOMATED LEUKOCYTE COUNT: CPT

## 2019-09-17 PROCEDURE — 80197 ASSAY OF TACROLIMUS: CPT

## 2019-09-17 PROCEDURE — 82565 ASSAY OF CREATININE: CPT

## 2019-09-17 PROCEDURE — 85014 HEMATOCRIT: CPT

## 2019-09-17 PROCEDURE — 84132 ASSAY OF SERUM POTASSIUM: CPT

## 2019-09-18 LAB — TACROLIMUS: 12.2 NG/ML

## 2019-09-23 ENCOUNTER — LAB ENCOUNTER (OUTPATIENT)
Dept: LAB | Age: 59
End: 2019-09-23
Attending: TRANSPLANT SURGERY
Payer: COMMERCIAL

## 2019-09-23 DIAGNOSIS — F03.90 ADVANCED DEMENTIA (HCC): ICD-10-CM

## 2019-09-23 DIAGNOSIS — Z94.0 KIDNEY REPLACED BY TRANSPLANT: ICD-10-CM

## 2019-09-23 DIAGNOSIS — J15.6 ACHROMOBACTER PNEUMONIA (HCC): Primary | ICD-10-CM

## 2019-09-23 PROCEDURE — 85048 AUTOMATED LEUKOCYTE COUNT: CPT

## 2019-09-23 PROCEDURE — 82947 ASSAY GLUCOSE BLOOD QUANT: CPT

## 2019-09-23 PROCEDURE — 80197 ASSAY OF TACROLIMUS: CPT

## 2019-09-23 PROCEDURE — 85014 HEMATOCRIT: CPT

## 2019-09-23 PROCEDURE — 84132 ASSAY OF SERUM POTASSIUM: CPT

## 2019-09-23 PROCEDURE — 82565 ASSAY OF CREATININE: CPT

## 2019-09-24 LAB
CREAT BLD-MCNC: 1.46 MG/DL (ref 0.55–1.02)
GLUCOSE BLD-MCNC: 118 MG/DL (ref 70–99)
HCT VFR BLD AUTO: 39.5 % (ref 35–48)
POTASSIUM SERPL-SCNC: 4.1 MMOL/L (ref 3.5–5.1)
WBC # BLD AUTO: 5.5 X10(3) UL (ref 4–11)

## 2019-09-28 LAB — TACROLIMUS: 9.7 NG/ML

## 2019-10-01 ENCOUNTER — LAB ENCOUNTER (OUTPATIENT)
Dept: LAB | Age: 59
End: 2019-10-01
Attending: TRANSPLANT SURGERY
Payer: COMMERCIAL

## 2019-10-01 DIAGNOSIS — Z79.899 ENCOUNTER FOR LONG-TERM (CURRENT) USE OF OTHER MEDICATIONS: ICD-10-CM

## 2019-10-01 DIAGNOSIS — Z51.81 ENCOUNTER FOR THERAPEUTIC DRUG MONITORING: ICD-10-CM

## 2019-10-01 DIAGNOSIS — Z94.0 KIDNEY REPLACED BY TRANSPLANT: ICD-10-CM

## 2019-10-01 DIAGNOSIS — Z79.52 LONG TERM CURRENT USE OF SYSTEMIC STEROIDS: Primary | ICD-10-CM

## 2019-10-01 PROCEDURE — 82947 ASSAY GLUCOSE BLOOD QUANT: CPT

## 2019-10-01 PROCEDURE — 85048 AUTOMATED LEUKOCYTE COUNT: CPT

## 2019-10-01 PROCEDURE — 85014 HEMATOCRIT: CPT

## 2019-10-01 PROCEDURE — 82565 ASSAY OF CREATININE: CPT

## 2019-10-01 PROCEDURE — 80197 ASSAY OF TACROLIMUS: CPT

## 2019-10-01 PROCEDURE — 84132 ASSAY OF SERUM POTASSIUM: CPT

## 2019-10-09 ENCOUNTER — LAB ENCOUNTER (OUTPATIENT)
Dept: LAB | Age: 59
End: 2019-10-09
Attending: TRANSPLANT SURGERY
Payer: COMMERCIAL

## 2019-10-09 DIAGNOSIS — Z94.0 KIDNEY REPLACED BY TRANSPLANT: ICD-10-CM

## 2019-10-09 DIAGNOSIS — Z79.52 LONG TERM CURRENT USE OF SYSTEMIC STEROIDS: Primary | ICD-10-CM

## 2019-10-09 DIAGNOSIS — Z79.899 ENCOUNTER FOR LONG-TERM (CURRENT) USE OF OTHER MEDICATIONS: ICD-10-CM

## 2019-10-09 PROCEDURE — 82565 ASSAY OF CREATININE: CPT

## 2019-10-09 PROCEDURE — 84132 ASSAY OF SERUM POTASSIUM: CPT

## 2019-10-09 PROCEDURE — 87799 DETECT AGENT NOS DNA QUANT: CPT

## 2019-10-09 PROCEDURE — 82947 ASSAY GLUCOSE BLOOD QUANT: CPT

## 2019-10-09 PROCEDURE — 85048 AUTOMATED LEUKOCYTE COUNT: CPT

## 2019-10-09 PROCEDURE — 80197 ASSAY OF TACROLIMUS: CPT

## 2019-10-09 PROCEDURE — 85014 HEMATOCRIT: CPT

## 2019-10-10 ENCOUNTER — LAB ENCOUNTER (OUTPATIENT)
Dept: LAB | Facility: HOSPITAL | Age: 59
End: 2019-10-10
Attending: INTERNAL MEDICINE
Payer: COMMERCIAL

## 2019-10-10 DIAGNOSIS — I51.9 MYXEDEMA HEART DISEASE: ICD-10-CM

## 2019-10-10 DIAGNOSIS — M81.0 SENILE OSTEOPOROSIS: Primary | ICD-10-CM

## 2019-10-10 DIAGNOSIS — E03.9 MYXEDEMA HEART DISEASE: ICD-10-CM

## 2019-10-10 PROCEDURE — 36415 COLL VENOUS BLD VENIPUNCTURE: CPT

## 2019-10-10 PROCEDURE — 80048 BASIC METABOLIC PNL TOTAL CA: CPT

## 2019-10-10 PROCEDURE — 82306 VITAMIN D 25 HYDROXY: CPT

## 2019-10-10 PROCEDURE — 84443 ASSAY THYROID STIM HORMONE: CPT

## 2019-12-10 PROBLEM — M67.442 MUCOUS CYST OF DIGIT OF LEFT HAND: Status: ACTIVE | Noted: 2019-12-10

## 2021-01-04 PROCEDURE — 88305 TISSUE EXAM BY PATHOLOGIST: CPT | Performed by: STUDENT IN AN ORGANIZED HEALTH CARE EDUCATION/TRAINING PROGRAM

## 2023-08-19 ENCOUNTER — HOSPITAL ENCOUNTER (EMERGENCY)
Facility: HOSPITAL | Age: 63
Discharge: HOME | End: 2023-08-19
Attending: EMERGENCY MEDICINE
Payer: COMMERCIAL

## 2023-08-19 ENCOUNTER — APPOINTMENT (OUTPATIENT)
Dept: GENERAL RADIOLOGY | Facility: HOSPITAL | Age: 63
End: 2023-08-19
Payer: COMMERCIAL

## 2023-08-19 VITALS
TEMPERATURE: 98 F | OXYGEN SATURATION: 97 % | HEART RATE: 73 BPM | SYSTOLIC BLOOD PRESSURE: 118 MMHG | BODY MASS INDEX: 26.29 KG/M2 | DIASTOLIC BLOOD PRESSURE: 88 MMHG | WEIGHT: 154 LBS | HEIGHT: 64 IN | RESPIRATION RATE: 18 BRPM

## 2023-08-19 DIAGNOSIS — J11.1 INFLUENZA: Primary | ICD-10-CM

## 2023-08-19 DIAGNOSIS — E87.6 HYPOKALEMIA: ICD-10-CM

## 2023-08-19 LAB
ALBUMIN SERPL-MCNC: 3.3 G/DL (ref 3.4–5)
ALBUMIN/GLOB SERPL: 0.9 {RATIO} (ref 1–2)
ALP LIVER SERPL-CCNC: 72 U/L
ALT SERPL-CCNC: 28 U/L
ANION GAP SERPL CALC-SCNC: 7 MMOL/L (ref 0–18)
AST SERPL-CCNC: 23 U/L (ref 15–37)
BASOPHILS # BLD AUTO: 0.05 X10(3) UL (ref 0–0.2)
BASOPHILS NFR BLD AUTO: 0.4 %
BILIRUB SERPL-MCNC: 0.3 MG/DL (ref 0.1–2)
BUN BLD-MCNC: 14 MG/DL (ref 7–18)
CALCIUM BLD-MCNC: 8.9 MG/DL (ref 8.5–10.1)
CHLORIDE SERPL-SCNC: 107 MMOL/L (ref 98–112)
CO2 SERPL-SCNC: 24 MMOL/L (ref 21–32)
CREAT BLD-MCNC: 0.92 MG/DL
EGFRCR SERPLBLD CKD-EPI 2021: 70 ML/MIN/1.73M2 (ref 60–?)
EOSINOPHIL # BLD AUTO: 0.07 X10(3) UL (ref 0–0.7)
EOSINOPHIL NFR BLD AUTO: 0.5 %
ERYTHROCYTE [DISTWIDTH] IN BLOOD BY AUTOMATED COUNT: 13.1 %
FLUAV + FLUBV RNA SPEC NAA+PROBE: NEGATIVE
FLUAV + FLUBV RNA SPEC NAA+PROBE: POSITIVE
GLOBULIN PLAS-MCNC: 3.6 G/DL (ref 2.8–4.4)
GLUCOSE BLD-MCNC: 149 MG/DL (ref 70–99)
HCT VFR BLD AUTO: 39.7 %
HGB BLD-MCNC: 13.5 G/DL
IMM GRANULOCYTES # BLD AUTO: 0.05 X10(3) UL (ref 0–1)
IMM GRANULOCYTES NFR BLD: 0.4 %
LYMPHOCYTES # BLD AUTO: 3.86 X10(3) UL (ref 1–4)
LYMPHOCYTES NFR BLD AUTO: 29.5 %
MCH RBC QN AUTO: 29.3 PG (ref 26–34)
MCHC RBC AUTO-ENTMCNC: 34 G/DL (ref 31–37)
MCV RBC AUTO: 86.3 FL
MONOCYTES # BLD AUTO: 2.12 X10(3) UL (ref 0.1–1)
MONOCYTES NFR BLD AUTO: 16.2 %
NEUTROPHILS # BLD AUTO: 6.92 X10 (3) UL (ref 1.5–7.7)
NEUTROPHILS # BLD AUTO: 6.92 X10(3) UL (ref 1.5–7.7)
NEUTROPHILS NFR BLD AUTO: 53 %
OSMOLALITY SERPL CALC.SUM OF ELEC: 289 MOSM/KG (ref 275–295)
PLATELET # BLD AUTO: 295 10(3)UL (ref 150–450)
POTASSIUM SERPL-SCNC: 3.1 MMOL/L (ref 3.5–5.1)
PROT SERPL-MCNC: 6.9 G/DL (ref 6.4–8.2)
RBC # BLD AUTO: 4.6 X10(6)UL
RSV RNA SPEC NAA+PROBE: NEGATIVE
SARS-COV-2 RNA RESP QL NAA+PROBE: NOT DETECTED
SODIUM SERPL-SCNC: 138 MMOL/L (ref 136–145)
WBC # BLD AUTO: 13.1 X10(3) UL (ref 4–11)

## 2023-08-19 PROCEDURE — 80053 COMPREHEN METABOLIC PANEL: CPT | Performed by: EMERGENCY MEDICINE

## 2023-08-19 PROCEDURE — 71046 X-RAY EXAM CHEST 2 VIEWS: CPT

## 2023-08-19 PROCEDURE — 85025 COMPLETE CBC W/AUTO DIFF WBC: CPT | Performed by: EMERGENCY MEDICINE

## 2023-08-19 PROCEDURE — 0241U SARS-COV-2/FLU A AND B/RSV BY PCR (GENEXPERT): CPT | Performed by: EMERGENCY MEDICINE

## 2023-08-19 PROCEDURE — 99285 EMERGENCY DEPT VISIT HI MDM: CPT

## 2023-08-19 PROCEDURE — 0241U SARS-COV-2/FLU A AND B/RSV BY PCR (GENEXPERT): CPT

## 2023-08-19 PROCEDURE — 96360 HYDRATION IV INFUSION INIT: CPT

## 2023-08-19 PROCEDURE — 99284 EMERGENCY DEPT VISIT MOD MDM: CPT

## 2023-08-19 RX ORDER — OSELTAMIVIR PHOSPHATE 75 MG/1
75 CAPSULE ORAL ONCE
Status: COMPLETED | OUTPATIENT
Start: 2023-08-19 | End: 2023-08-19

## 2023-08-19 RX ORDER — OSELTAMIVIR PHOSPHATE 75 MG/1
75 CAPSULE ORAL 2 TIMES DAILY
Qty: 10 CAPSULE | Refills: 0 | Status: SHIPPED | OUTPATIENT
Start: 2023-08-19 | End: 2023-08-24

## 2023-08-20 NOTE — ED INITIAL ASSESSMENT (HPI)
PT PRESENTS TO ED WITH FEVER, COUGH, CONGESTION THAT STARTED THURSDAY.   PT STATES SHE HAS NAUSEA, VOMITING AND DIARRHEA

## 2023-08-20 NOTE — DISCHARGE INSTRUCTIONS
Drink plenty of fluids - especially low-calorie sports drinks like Gatorade. Take 1000 mg acetaminophen (2 Tylenol tablets) every 6 hours as needed for pain or fever. Continue taking the Augmentin due to being immunosuppressed though bacterial infection unlikely given that influenza test is positive.

## (undated) NOTE — ED AVS SNAPSHOT
Rowena Mcdonald   MRN: [id number]    Department:  [facility] Emergency Department   Date of Visit:  8/11/2018           Disclosure     Insurance plans vary and the physician(s) referred by the ER may not be covered by your plan.  Please contact you tell this physician (or your personal doctor if your instructions are to return to your personal doctor) about any new or lasting problems. The primary care or specialist physician will see patients referred from the [facility] Emergency Department.  Severo Pastures

## (undated) NOTE — ED AVS SNAPSHOT
Nacho Vincent   MRN: [id number]    Department:  [facility] Emergency Department   Date of Visit:  12/29/2018           Disclosure     Insurance plans vary and the physician(s) referred by the ER may not be covered by your plan.  Please contact yo tell this physician (or your personal doctor if your instructions are to return to your personal doctor) about any new or lasting problems. The primary care or specialist physician will see patients referred from the [facility] Emergency Department.  Daniel Green